# Patient Record
Sex: MALE | NOT HISPANIC OR LATINO | URBAN - METROPOLITAN AREA
[De-identification: names, ages, dates, MRNs, and addresses within clinical notes are randomized per-mention and may not be internally consistent; named-entity substitution may affect disease eponyms.]

---

## 2019-06-26 ENCOUNTER — APPOINTMENT (RX ONLY)
Dept: URBAN - METROPOLITAN AREA CLINIC 52 | Facility: CLINIC | Age: 49
Setting detail: DERMATOLOGY
End: 2019-06-26

## 2019-06-26 DIAGNOSIS — L73.9 FOLLICULAR DISORDER, UNSPECIFIED: ICD-10-CM

## 2019-06-26 DIAGNOSIS — D22 MELANOCYTIC NEVI: ICD-10-CM

## 2019-06-26 DIAGNOSIS — L738 OTHER SPECIFIED DISEASES OF HAIR AND HAIR FOLLICLES: ICD-10-CM

## 2019-06-26 DIAGNOSIS — L663 OTHER SPECIFIED DISEASES OF HAIR AND HAIR FOLLICLES: ICD-10-CM

## 2019-06-26 PROBLEM — L02.421 FURUNCLE OF RIGHT AXILLA: Status: ACTIVE | Noted: 2019-06-26

## 2019-06-26 PROBLEM — C44.319 BASAL CELL CARCINOMA OF SKIN OF OTHER PARTS OF FACE: Status: ACTIVE | Noted: 2019-06-26

## 2019-06-26 PROBLEM — L02.223 FURUNCLE OF CHEST WALL: Status: ACTIVE | Noted: 2019-06-26

## 2019-06-26 PROBLEM — D48.5 NEOPLASM OF UNCERTAIN BEHAVIOR OF SKIN: Status: ACTIVE | Noted: 2019-06-26

## 2019-06-26 PROCEDURE — ? COUNSELING

## 2019-06-26 PROCEDURE — ? CONSULTATION FOR MOHS SURGERY

## 2019-06-26 PROCEDURE — ? BIOPSY BY SHAVE METHOD

## 2019-06-26 PROCEDURE — ? TREATMENT REGIMEN

## 2019-06-26 PROCEDURE — ? PRESCRIPTION

## 2019-06-26 PROCEDURE — 99202 OFFICE O/P NEW SF 15 MIN: CPT | Mod: 25

## 2019-06-26 PROCEDURE — 11102 TANGNTL BX SKIN SINGLE LES: CPT

## 2019-06-26 RX ORDER — CLINDAMYCIN PHOSPHATE 10 MG/ML
LOTION TOPICAL QD
Qty: 1 | Refills: 3 | Status: ERX | COMMUNITY
Start: 2019-06-26

## 2019-06-26 RX ADMIN — CLINDAMYCIN PHOSPHATE: 10 LOTION TOPICAL at 14:50

## 2019-06-26 ASSESSMENT — LOCATION SIMPLE DESCRIPTION DERM
LOCATION SIMPLE: RIGHT LOWER BACK
LOCATION SIMPLE: CHEST
LOCATION SIMPLE: RIGHT AXILLA

## 2019-06-26 ASSESSMENT — LOCATION ZONE DERM
LOCATION ZONE: AXILLAE
LOCATION ZONE: TRUNK

## 2019-06-26 ASSESSMENT — PAIN INTENSITY VAS: HOW INTENSE IS YOUR PAIN 0 BEING NO PAIN, 10 BEING THE MOST SEVERE PAIN POSSIBLE?: 1/10 PAIN

## 2019-06-26 ASSESSMENT — LOCATION DETAILED DESCRIPTION DERM
LOCATION DETAILED: STERNUM
LOCATION DETAILED: RIGHT POSTERIOR AXILLA
LOCATION DETAILED: RIGHT SUPERIOR MEDIAL MIDBACK

## 2019-06-26 ASSESSMENT — SEVERITY ASSESSMENT: SEVERITY: MILD

## 2019-06-26 NOTE — PROCEDURE: BIOPSY BY SHAVE METHOD
Silver Nitrate Text: The wound bed was treated with silver nitrate after the biopsy was performed.
Body Location Override (Optional - Billing Will Still Be Based On Selected Body Map Location If Applicable): lower central back
Dressing: bandage
Detail Level: Detailed
Biopsy Method: Dermablade
Bill 79008 For Specimen Handling/Conveyance To Laboratory?: no
Billing Type: Third-Party Bill
Hemostasis: Drysol
Was A Bandage Applied: Yes
Lab: [id number]
Curettage Text: The wound bed was treated with curettage after the biopsy was performed.
Anesthesia Type: 1% lidocaine with epinephrine
Post-Care Instructions: I reviewed with the patient in detail post-care instructions. Patient is to keep the biopsy site dry overnight, and then apply bacitracin twice daily until healed. Patient may apply hydrogen peroxide soaks to remove any crusting.
Size Of Lesion In Cm: 1
Notification Instructions: Patient will be notified of biopsy results. However, patient instructed to call the office if not contacted within 2 weeks.
Additional Anesthesia Volume In Cc (Will Not Render If 0): 0
Cryotherapy Text: The wound bed was treated with cryotherapy after the biopsy was performed.
Wound Care: Petrolatum
Lab Facility: [id number]
Electrodesiccation Text: The wound bed was treated with electrodesiccation after the biopsy was performed.
Depth Of Biopsy: dermis
Biopsy Type: H and E
Consent: Written consent was obtained and risks were reviewed including but not limited to scarring, infection, bleeding, scabbing, incomplete removal, nerve damage and allergy to anesthesia.
X Size Of Lesion In Cm: 0.6
Accession #: [id number]
Anesthesia Volume In Cc (Will Not Render If 0): 0.5
Type Of Destruction Used: Curettage
Electrodesiccation And Curettage Text: The wound bed was treated with electrodesiccation and curettage after the biopsy was performed.

## 2019-06-26 NOTE — PROCEDURE: CONSULTATION FOR MOHS SURGERY
Detail Level: Detailed
Incorporate Mauc In Note: Yes
Anatomic Location From Referring Provider: right central forehead
X Size Of Lesion In Cm (Optional): 0.5
Location Indication Override (Is Already Calculated Based On Selected Body Location): Area M
Size Of Lesion: 0.4

## 2019-06-26 NOTE — PROCEDURE: TREATMENT REGIMEN
Plan: Patient states benzoyl peroxide dries out skin and exacerbates folliculitis and wants to avoid antibiotics. Will start Clindamycin lotion 1%.
Detail Level: Zone

## 2019-06-26 NOTE — HPI: SKIN LESION (BASAL CELL CARCINOMA)
Is This A New Presentation, Or A Follow-Up?: Basal Cell Carcinoma
Additional History: Patient has a nodular basal cell carcinoma on his right forehead and was recommended to have Mohs Surgery. Patient now here for second opinion on lesion.
Location From Outside Provider (Will Override Previously Chosen Location): Right central forehead
When Was Basal Cell Biopsied? (Optional): 5/2/19
Accession # (Optional): [id number]